# Patient Record
(demographics unavailable — no encounter records)

---

## 2025-02-03 NOTE — HISTORY OF PRESENT ILLNESS
[FreeTextEntry1] : Mother with breast cancer at age 58 (NATALI mutation) and maternal aunt with breast cancer In remission from Hodgkin's disease status post radiation Jodi model shows a 2.5% 5-year and 24.3% lifetime risk of breast cancer  Patient denies any breast masses or nipple discharge.  Patient had been suffering from severe headaches that eventually just went away but during the workup she had an echocardiogram which showed that she had nonischemic cardiomyopathy possibly from a viral infection or her prior chemotherapy.  Patient is overdue for her films.  43-year-old postmenopausal woman with a strong family history of breast cancer presents for surveillance and extra screening as a high risk patient. In 2003 she had Hodgkin's disease successfully treated with stem cell transplant and mantle radiation. Patient is currently asymptomatic and denies any breast masses or nipple discharge. Patient's maternal aunt had breast cancer at 63 and her mother had breast cancer at age 58 and gene tested positive for NATALI mutation, the patient has refused testing so far. Patient's had a benign breast biopsy before in the past but is never taken hormone replacement therapy. Jodi model shows a 2.5% 5-year and 24.3% lifetime risk of breast cancer.

## 2025-03-31 NOTE — HISTORY OF PRESENT ILLNESS
[FreeTextEntry1] : 46/F Hodgkins lymphoma 2003 s/p ABVD x 6 months relapse 3 yrs (2006) s/p chemo, radiation and stem cell transplant. Palpitation.   walks 30-45mins daily for exercise. no issues climbing stairs. no CP/pressure. no dizziness or Syncope. no leg swelling, no orthopnea or PND.   FH: Mother has MVP planned for Procedure SH: Social alcohol use, smoked occasionally in college

## 2025-07-02 NOTE — PHYSICAL EXAM
[Well Developed] : well developed [Well Nourished] : well nourished [No Acute Distress] : no acute distress [Normal Venous Pressure] : normal venous pressure [Normal S1, S2] : normal S1, S2 [No Murmur] : no murmur [No Rub] : no rub [No Gallop] : no gallop [Clear Lung Fields] : clear lung fields [Good Air Entry] : good air entry [No Respiratory Distress] : no respiratory distress  [Soft] : abdomen soft [Normal Gait] : normal gait [No Edema] : no edema [Moves all extremities] : moves all extremities [No Focal Deficits] : no focal deficits [Alert and Oriented] : alert and oriented [Normal memory] : normal memory [de-identified] : JVP < 8cm H2O [de-identified] : warm peripherally

## 2025-07-02 NOTE — ASSESSMENT
[FreeTextEntry1] : Ms. Santos is a 47 y/o F with history of Hodkins lymphoma (2003) treated with stem cell transplant and mantle radiation as well as chemotherapy with adriamycin, with a dilated NICM, likely chemo induced with LVSD. She presents today for follow up over all doing well, endorsing NYHA Class I symptoms. She's normotensive tolerating low dose GDMT. Recent holter showing SR-ST with one brief episode of SVT. Most recent labs show normal renal function and borderline hyperkalemia prior to Entresto.  1. NICM, HFrEF - Will increase coreg to 6.25mg BID. Reported significant fatigue with Toprol XL in the past.  - Will continue current dose of Entresto 24-26mg BID - Will repeat labs to reassess renal function and potassium on Entresto. Advised to follow a low potassium diet. - Discussed MRA/SGLT2i, she's hesitant given side effect profile, but open to further discussion. HF book with reading material provided.  - Plan for repeat TTE once on max tolerated GDMT - No device  2. History of Hodkins lymphoma s/p stem cell transplant - Follow up with heme/onc  3. hx of palpitations - Recently denies palpitations - EKG done today to assess, showing NSR, no ectopy. - Holter reviewed as above - Beta blocker as above  Follow up with HF NP in 1-2 weeks for ongoing medication uptitration.

## 2025-07-02 NOTE — REVIEW OF SYSTEMS
[FreeTextEntry1] : 14 point ROS done and found to be negative or non contributory other than noted in HPI.

## 2025-07-02 NOTE — HISTORY OF PRESENT ILLNESS
[FreeTextEntry1] : 46/F Hodgkins lymphoma 2003 s/p ABVD x 6 months relapse 3 yrs (2006) s/p chemo, radiation and stem cell transplant. She had a cMRI done 1/2025 showing LVEF 39%, LVEDVi 104, normal RV, no LGE. She'd reported palpitations for which she had a 1 week holter 4/2025 which showed primarily SR HR , one brief episode of SVT.1.3% PVC burden.   At her initial consult visit with Dr. Prieto she was started on Entresto 24-26mg BID. She's since been feeling well. She noted mild lightheadedness/dizziness when she started the medication for a week or so but it's since resolved. Weight 149 from 145lbs in clinic. She's active, working out at the gym lifting weights and doing the stair climber without BAILEY, lightheadedness/dizziness, or palpitations. She checked a few BP readings at rest which have ranged 101/6-116/79 HR 79-84. She denies orthopnea, PND, CP, palpitations, abdominal distension and LE edema.   FH: Mother has MVP planned for Procedure SH: Social alcohol use, smoked occasionally in college